# Patient Record
Sex: MALE | Race: WHITE | NOT HISPANIC OR LATINO | Employment: FULL TIME | URBAN - METROPOLITAN AREA
[De-identification: names, ages, dates, MRNs, and addresses within clinical notes are randomized per-mention and may not be internally consistent; named-entity substitution may affect disease eponyms.]

---

## 2022-09-21 ASSESSMENT — ENCOUNTER SYMPTOMS
COUGH: 0
WEAKNESS: 0
ABDOMINAL PAIN: 0
DIARRHEA: 0
JOINT SWELLING: 0
CONSTIPATION: 0
DYSURIA: 0
HEMATOCHEZIA: 0
SHORTNESS OF BREATH: 0
DIZZINESS: 0
MYALGIAS: 0
EYE PAIN: 0
HEADACHES: 0
FREQUENCY: 0
CHILLS: 0
NERVOUS/ANXIOUS: 0
NAUSEA: 0
FEVER: 0
HEMATURIA: 0
PARESTHESIAS: 0
HEARTBURN: 0
PALPITATIONS: 0
ARTHRALGIAS: 0
SORE THROAT: 0

## 2022-09-28 ENCOUNTER — OFFICE VISIT (OUTPATIENT)
Dept: FAMILY MEDICINE | Facility: CLINIC | Age: 32
End: 2022-09-28
Payer: COMMERCIAL

## 2022-09-28 VITALS
OXYGEN SATURATION: 98 % | BODY MASS INDEX: 32.03 KG/M2 | WEIGHT: 249.6 LBS | RESPIRATION RATE: 18 BRPM | SYSTOLIC BLOOD PRESSURE: 137 MMHG | HEART RATE: 95 BPM | TEMPERATURE: 98.2 F | DIASTOLIC BLOOD PRESSURE: 66 MMHG | HEIGHT: 74 IN

## 2022-09-28 DIAGNOSIS — R03.0 ELEVATED BLOOD PRESSURE READING WITHOUT DIAGNOSIS OF HYPERTENSION: ICD-10-CM

## 2022-09-28 DIAGNOSIS — Z00.00 ROUTINE GENERAL MEDICAL EXAMINATION AT A HEALTH CARE FACILITY: Primary | ICD-10-CM

## 2022-09-28 DIAGNOSIS — G47.33 OBSTRUCTIVE SLEEP APNEA SYNDROME: ICD-10-CM

## 2022-09-28 DIAGNOSIS — E66.811 CLASS 1 OBESITY DUE TO EXCESS CALORIES WITH SERIOUS COMORBIDITY IN ADULT, UNSPECIFIED BMI: ICD-10-CM

## 2022-09-28 DIAGNOSIS — E66.09 CLASS 1 OBESITY DUE TO EXCESS CALORIES WITH SERIOUS COMORBIDITY IN ADULT, UNSPECIFIED BMI: ICD-10-CM

## 2022-09-28 DIAGNOSIS — L73.9 FOLLICULITIS: ICD-10-CM

## 2022-09-28 LAB
CHOLEST SERPL-MCNC: 225 MG/DL
FASTING STATUS PATIENT QL REPORTED: NO
FASTING STATUS PATIENT QL REPORTED: NO
GLUCOSE BLD-MCNC: 85 MG/DL (ref 70–99)
HDLC SERPL-MCNC: 42 MG/DL
LDLC SERPL CALC-MCNC: 152 MG/DL
NONHDLC SERPL-MCNC: 183 MG/DL
TRIGL SERPL-MCNC: 157 MG/DL

## 2022-09-28 PROCEDURE — 90715 TDAP VACCINE 7 YRS/> IM: CPT | Performed by: PHYSICIAN ASSISTANT

## 2022-09-28 PROCEDURE — 90472 IMMUNIZATION ADMIN EACH ADD: CPT | Performed by: PHYSICIAN ASSISTANT

## 2022-09-28 PROCEDURE — 0124A COVID-19,PF,PFIZER BOOSTER BIVALENT: CPT | Performed by: PHYSICIAN ASSISTANT

## 2022-09-28 PROCEDURE — 82947 ASSAY GLUCOSE BLOOD QUANT: CPT | Performed by: PHYSICIAN ASSISTANT

## 2022-09-28 PROCEDURE — 90686 IIV4 VACC NO PRSV 0.5 ML IM: CPT | Performed by: PHYSICIAN ASSISTANT

## 2022-09-28 PROCEDURE — 90471 IMMUNIZATION ADMIN: CPT | Performed by: PHYSICIAN ASSISTANT

## 2022-09-28 PROCEDURE — 91312 COVID-19,PF,PFIZER BOOSTER BIVALENT: CPT | Performed by: PHYSICIAN ASSISTANT

## 2022-09-28 PROCEDURE — 99203 OFFICE O/P NEW LOW 30 MIN: CPT | Mod: 25 | Performed by: PHYSICIAN ASSISTANT

## 2022-09-28 PROCEDURE — 36415 COLL VENOUS BLD VENIPUNCTURE: CPT | Performed by: PHYSICIAN ASSISTANT

## 2022-09-28 PROCEDURE — 99385 PREV VISIT NEW AGE 18-39: CPT | Mod: 25 | Performed by: PHYSICIAN ASSISTANT

## 2022-09-28 PROCEDURE — 80061 LIPID PANEL: CPT | Performed by: PHYSICIAN ASSISTANT

## 2022-09-28 ASSESSMENT — ENCOUNTER SYMPTOMS
DIARRHEA: 0
FREQUENCY: 0
HEMATOCHEZIA: 0
HEMATURIA: 0
HEARTBURN: 0
DIZZINESS: 0
EYE PAIN: 0
NAUSEA: 0
CHILLS: 0
ARTHRALGIAS: 0
PALPITATIONS: 0
DYSURIA: 0
SHORTNESS OF BREATH: 0
PARESTHESIAS: 0
COUGH: 0
NERVOUS/ANXIOUS: 0
JOINT SWELLING: 0
CONSTIPATION: 0
WEAKNESS: 0
SORE THROAT: 0
ABDOMINAL PAIN: 0
HEADACHES: 0
FEVER: 0
MYALGIAS: 0

## 2022-09-28 NOTE — PROGRESS NOTES
SUBJECTIVE:   CC: Dwain is an 32 year old who presents for preventative health visit.     Patient has been advised of split billing requirements and indicates understanding: Yes  Healthy Habits:     Getting at least 3 servings of Calcium per day:  NO    Bi-annual eye exam:  Yes    Dental care twice a year:  Yes    Sleep apnea or symptoms of sleep apnea:  None    Diet:  Carbohydrate counting and Breakfast skipped    Frequency of exercise:  2-3 days/week    Duration of exercise:  15-30 minutes    Taking medications regularly:  Yes    Medication side effects:  Not applicable    PHQ-2 Total Score: 0    Additional concerns today:  No    Sleep apnea diagnosed in the past and they recommended CPAP but he never used. He has been working on weight loss and feels that his sleep has improved.    He was at the dentist a few months ago and blood pressure was a little elevated so they recommended he get that checked out. No history of HTN but family history of HTN in dad.    Working on weight loss - eating better, increased exercise. Has lost about 10 pounds in past few months.    He has occasional pimples on his upper thighs. Sometimes a deeper, more painful spot but usually just little bumps that eventually go away on their own.    Works from home in [employer]. Wife is a nurse. No children but they have been trying for pregnancy for the past few months.    Today's PHQ-2 Score:   PHQ-2 ( 1999 [employer]) 9/21/2022   Q1: Little interest or pleasure in doing things 0   Q2: Feeling down, depressed or hopeless 0   PHQ-2 Score 0   Q1: Little interest or pleasure in doing things Not at all   Q2: Feeling down, depressed or hopeless Not at all   PHQ-2 Score 0     Abuse: Current or Past(Physical, Sexual or Emotional)- No  Do you feel safe in your environment? Yes    Have you ever done Advance Care Planning? (For example, a Health Directive, POLST, or a discussion with a medical provider or your loved ones about your wishes): No, advance care  planning information given to patient to review.  Patient declined advance care planning discussion at this time.    Social History     Tobacco Use     Smoking status: Former Smoker     Smokeless tobacco: Former User     Tobacco comment: quit in 2015, smoked for a few years in college   Substance Use Topics     Alcohol use: Yes     Comment: OCCAS.     Alcohol Use 9/21/2022   Prescreen: >3 drinks/day or >7 drinks/week? No     Last PSA: No results found for: PSA    Reviewed orders with patient. Reviewed health maintenance and updated orders accordingly - Yes  Lab work is in process  Labs reviewed in EPIC  BP Readings from Last 3 Encounters:   09/28/22 137/66   05/22/12 123/91   08/15/08 110/84 (27 %, Z = -0.61 /  94 %, Z = 1.55)*     *BP percentiles are based on the 2017 AAP Clinical Practice Guideline for boys    Wt Readings from Last 3 Encounters:   09/28/22 113.2 kg (249 lb 9.6 oz)   05/22/12 73.1 kg (161 lb 4.3 oz)   06/14/09 82.2 kg (181 lb 4.8 oz) (89 %, Z= 1.22)*     * Growth percentiles are based on [facility] (Boys, 2-20 Years) data.                  Patient Active Problem List   Diagnosis     Obesity, Class I, BMI 30-34.9     Obstructive sleep apnea syndrome     Elevated blood pressure reading without diagnosis of hypertension     Past Surgical History:   Procedure Laterality Date     ARTHROSCOPIC RECONSTRUCTION ANTERIOR CRUCIATE LIGAMENT  5/22/2012    Procedure:ARTHROSCOPIC RECONSTRUCTION ANTERIOR CRUCIATE LIGAMENT; LEFT KNEE REVISION ARTHROSCOPIC ANTERIOR CRUCIATE LIGAMENT RECONSTRUCTION  WITH HAMSTRING TENDON AUTOGRAFT      ; Surgeon:MARINE VERGARA; Location:[facility]     ORTHOPEDIC SURGERY      LEFT KNEE ACL RECONSTRUCTION     SOFT TISSUE SURGERY      PYLONIDAL CYST       Social History     Tobacco Use     Smoking status: Former Smoker     Smokeless tobacco: Former User     Tobacco comment: quit in 2015, smoked for a few years in college   Substance Use Topics     Alcohol use: Yes     Comment: OCCAS.     Family  "History   Problem Relation Age of Onset     Diabetes Father      Hypertension Father      Hypertension Paternal Grandmother      Myocardial Infarction Paternal Grandmother      Hypertension Paternal Grandfather      Myocardial Infarction Paternal Grandfather      Colon Cancer No family hx of      Prostate Cancer No family hx of      Breast Cancer No family hx of          No current outpatient medications on file.     Allergies   Allergen Reactions     Sulfa Drugs Hives     No lab results found.     Reviewed and updated as needed this visit by clinical staff   Tobacco  Allergies  Meds  Problems  Med Hx  Surg Hx  Fam Hx  Soc   Hx        Reviewed and updated as needed this visit by Provider   Tobacco  Allergies  Meds  Problems  Med Hx  Surg Hx  Fam Hx               Review of Systems   Constitutional: Negative for chills and fever.   HENT: Negative for congestion, ear pain, hearing loss and sore throat.    Eyes: Negative for pain and visual disturbance.   Respiratory: Negative for cough and shortness of breath.    Cardiovascular: Negative for chest pain, palpitations and peripheral edema.   Gastrointestinal: Negative for abdominal pain, constipation, diarrhea, heartburn, hematochezia and nausea.   Genitourinary: Negative for dysuria, frequency, genital sores, hematuria, impotence, penile discharge and urgency.   Musculoskeletal: Negative for arthralgias, joint swelling and myalgias.   Skin: Negative for rash.   Neurological: Negative for dizziness, weakness, headaches and paresthesias.   Psychiatric/Behavioral: Negative for mood changes. The patient is not nervous/anxious.        OBJECTIVE:   /66 (BP Location: Right arm, Patient Position: Sitting, Cuff Size: Adult Large)   Pulse 95   Temp 98.2  F (36.8  C) (Oral)   Resp 18   Ht 1.867 m (6' 1.5\")   Wt 113.2 kg (249 lb 9.6 oz)   SpO2 98%   BMI 32.48 kg/m      Physical Exam  GENERAL: healthy, alert and no distress  EYES: Eyes grossly normal to " inspection, PERRL and conjunctivae and sclerae normal  HENT: ear canals and TM's normal, nose and mouth without ulcers or lesions  NECK: no adenopathy, no asymmetry, masses, or scars and thyroid normal to palpation  RESP: lungs clear to auscultation - no rales, rhonchi or wheezes  CV: regular rate and rhythm, normal S1 S2, no S3 or S4, no murmur, click or rub, no peripheral edema and peripheral pulses strong  ABDOMEN: soft, nontender, no hepatosplenomegaly, no masses and bowel sounds normal  MS: no gross musculoskeletal defects noted, no edema  SKIN: no suspicious lesions or rashes; few small papules/pustules on thighs  NEURO: Normal strength and tone, mentation intact and speech normal  PSYCH: mentation appears normal, affect normal/bright    Diagnostic Test Results:  Labs reviewed in Epic    ASSESSMENT/PLAN:   Routine general medical examination at a health care facility  Reviewed personal and family history. Reviewed age appropriate screenings. Reviewed healthy BP and BMI ranges. Counseled on lifestyle modifications for optimal mental and physical health.  Discussed age-appropriate health maintenance. Recommended any needed vaccinations. Continue to focus on well balanced diet and exercise.   - Lipid panel reflex to direct LDL Non-fasting; Future  - Glucose; Future  - Lipid panel reflex to direct LDL Non-fasting  - Glucose    Folliculitis  Mild. No sign of spreading infection. Recommend benzoyl peroxide cleanser daily, loose fitting clothing, frequent changing of any sweaty clothes after exercise. Monitor and follow-up if no improvement or worsening.    Class 1 obesity due to excess calories with serious comorbidity in adult, unspecified BMI  Discussed healthy diet, exercise, weight loss.     Obstructive sleep apnea syndrome  He has never used CPAP and feels like sleep has improved after he lost about 10 pounds recently. Discussed untreated sleep apnea can contribute to HTN so we should monitor and consider  "follow-up with sleep clinic.    Elevated blood pressure reading without diagnosis of hypertension  Recommend working on lifestyle changes and continuing to monitor.      COUNSELING:   Reviewed preventive health counseling, as reflected in patient instructions    Estimated body mass index is 32.48 kg/m  as calculated from the following:    Height as of this encounter: 1.867 m (6' 1.5\").    Weight as of this encounter: 113.2 kg (249 lb 9.6 oz).     Weight management plan: Discussed healthy diet and exercise guidelines    He reports that he has quit smoking. He has quit using smokeless tobacco.      Counseling Resources:  ATP IV Guidelines  Pooled Cohorts Equation Calculator  FRAX Risk Assessment  ICSI Preventive Guidelines  Dietary Guidelines for Americans, 2010  USDA's MyPlate  ASA Prophylaxis  Lung CA Screening    CAROLEE Jolley [facility]  "

## 2022-09-28 NOTE — PATIENT INSTRUCTIONS
Recommend wearing loose-fitting clothing, particularly during exercise, and changing shirts after activities with excessive sweating seem to be helpful. Recommend daily use of an antimicrobial cleanser, such as a cleanser containing benzoyl peroxide.      Preventive Health Recommendations  Male Ages 26 - 39    Yearly exam:             See your health care provider every year in order to  o   Review health changes.   o   Discuss preventive care.    o   Review your medicines if your doctor has prescribed any.  You should be tested each year for STDs (sexually transmitted diseases), if you re at risk.   After age 35, talk to your provider about cholesterol testing. If you are at risk for heart disease, have your cholesterol tested at least every 5 years.   If you are at risk for diabetes, you should have a diabetes test (fasting glucose).  Shots: Get a flu shot each year. Get a tetanus shot every 10 years.     Nutrition:  Eat at least 5 servings of fruits and vegetables daily.   Eat whole-grain bread, whole-wheat pasta and brown rice instead of white grains and rice.   Get adequate Calcium and Vitamin D.     Lifestyle  Exercise for at least 150 minutes a week (30 minutes a day, 5 days a week). This will help you control your weight and prevent disease.   Limit alcohol to one drink per day.   No smoking.   Wear sunscreen to prevent skin cancer.   See your dentist every six months for an exam and cleaning.

## 2023-08-29 ENCOUNTER — PATIENT OUTREACH (OUTPATIENT)
Dept: CARE COORDINATION | Facility: CLINIC | Age: 33
End: 2023-08-29

## 2023-09-12 ENCOUNTER — PATIENT OUTREACH (OUTPATIENT)
Dept: CARE COORDINATION | Facility: CLINIC | Age: 33
End: 2023-09-12

## 2023-10-29 ENCOUNTER — HEALTH MAINTENANCE LETTER (OUTPATIENT)
Age: 33
End: 2023-10-29

## 2024-12-21 ENCOUNTER — HEALTH MAINTENANCE LETTER (OUTPATIENT)
Age: 34
End: 2024-12-21